# Patient Record
Sex: MALE | Race: WHITE | URBAN - METROPOLITAN AREA
[De-identification: names, ages, dates, MRNs, and addresses within clinical notes are randomized per-mention and may not be internally consistent; named-entity substitution may affect disease eponyms.]

---

## 2022-03-18 PROBLEM — F51.04 INSOMNIA, PSYCHOPHYSIOLOGICAL: Status: ACTIVE | Noted: 2021-10-25

## 2022-03-19 PROBLEM — F41.1 GAD (GENERALIZED ANXIETY DISORDER): Status: ACTIVE | Noted: 2021-09-27

## 2022-03-20 PROBLEM — F31.76 BIPOLAR DISORDER, IN FULL REMISSION, MOST RECENT EPISODE DEPRESSED (HCC): Status: ACTIVE | Noted: 2021-09-27

## 2022-07-14 ENCOUNTER — TELEMEDICINE (OUTPATIENT)
Dept: BEHAVIORAL/MENTAL HEALTH CLINIC | Age: 47
End: 2022-07-14
Payer: COMMERCIAL

## 2022-07-14 DIAGNOSIS — F41.1 GAD (GENERALIZED ANXIETY DISORDER): ICD-10-CM

## 2022-07-14 DIAGNOSIS — F51.04 INSOMNIA, PSYCHOPHYSIOLOGICAL: Primary | ICD-10-CM

## 2022-07-14 DIAGNOSIS — F31.76 BIPOLAR DISORDER, IN FULL REMISSION, MOST RECENT EPISODE DEPRESSED (HCC): ICD-10-CM

## 2022-07-14 PROCEDURE — 99215 OFFICE O/P EST HI 40 MIN: CPT | Performed by: NURSE PRACTITIONER

## 2022-07-14 RX ORDER — ARIPIPRAZOLE 20 MG/1
20 TABLET ORAL DAILY
Qty: 30 TABLET | Refills: 3 | Status: SHIPPED | OUTPATIENT
Start: 2022-07-14 | End: 2022-10-14 | Stop reason: SDUPTHER

## 2022-07-14 RX ORDER — QUETIAPINE FUMARATE 400 MG/1
800 TABLET, FILM COATED ORAL NIGHTLY
Qty: 60 TABLET | Refills: 3 | Status: SHIPPED | OUTPATIENT
Start: 2022-07-14 | End: 2022-10-14 | Stop reason: SDUPTHER

## 2022-07-14 RX ORDER — CLONAZEPAM 1 MG/1
1 TABLET ORAL 2 TIMES DAILY PRN
Qty: 60 TABLET | Refills: 2 | Status: SHIPPED | OUTPATIENT
Start: 2022-07-14 | End: 2022-10-14 | Stop reason: SDUPTHER

## 2022-07-14 RX ORDER — TEMAZEPAM 30 MG/1
30 CAPSULE ORAL NIGHTLY PRN
Qty: 30 CAPSULE | Refills: 2 | Status: SHIPPED | OUTPATIENT
Start: 2022-07-14 | End: 2022-10-14 | Stop reason: SDUPTHER

## 2022-07-14 RX ORDER — LITHIUM CARBONATE 300 MG/1
300 CAPSULE ORAL DAILY
Qty: 30 CAPSULE | Refills: 3 | Status: SHIPPED | OUTPATIENT
Start: 2022-07-14 | End: 2022-10-14 | Stop reason: SDUPTHER

## 2022-07-14 NOTE — PROGRESS NOTES
OUTPATIENT PSYCHIATRIC RETURN VISIT PROGRESS NOTE    I was in the office While conducting this encounter. Patient was at home. She and/ or her healthcare decision maker is aware that this patient-initialed Telehealth encounter is a billable service with coverage as determined by her insurance carrier. She is aware that she may receive a bill and has provided verbal consent to proceed: Yes    The Virtual visit was conducted via [employer]. Pursuant to the emergency declaration under the [facility], [facility] waiver authority and the [employer] Act, this Virtual  Visit was conducted to reduce the patient's risk of exposure to COVID-19 and provide continuity of care for an established patient. Services were provided through a video synchronous discussion virtually to substitute for in-person clinic visit. Due to this being a TeleHealth evaluation, many elements of the physical examination are unable to be assessed. Total Time:  minutes: 40-54 minutes          Date of Service: 7/14/2022     Kenia Bruce is a 40 y.o.  with a past psychiatric history of bipolar d/o, anxiety, insomnia  who presents today for a psychiatric follow up appointment. CC:  Routine medication management follow up. No chief complaint on file. Subjective / Interval History:    Pt  Visit via [employer]  today and reports that  His mood has been stable. Denies depressive or manic episodes since last visit. Sleeping and eating well. Still working nights which is his preference. All medications effective and wishes to continue at current doses. He has done well with the reduced dose of Lithium with no change in mood. Pt has been medication compliant and denies any side-effects. Pt denies SI, HI and AVH. Does not endorse any manic or psychotic symptoms.     Psychiatric Review Of Systems:  Sleep: generally restful sleep  Appetite: ok  Current suicidal/homicidal ideations: Denies SI/ HI  Current auditory/visual hallucinations: Denies     Medications:    Current Outpatient Medications:     ARIPiprazole (ABILIFY) 20 MG tablet, Take 20 mg by mouth daily, Disp: , Rfl:     clonazePAM (KLONOPIN) 1 MG tablet, Take 1 mg by mouth 2 times daily as needed. , Disp: , Rfl:     lithium 300 MG capsule, Take 600 mg by mouth, Disp: , Rfl:     meloxicam (MOBIC) 15 MG tablet, TAKE 1 (ONE) TABLET ONCE DAILY. MAY MAKE DROWSY, Disp: , Rfl:     QUEtiapine (SEROQUEL) 400 MG tablet, Take 800 mg by mouth, Disp: , Rfl:     temazepam (RESTORIL) 30 MG capsule, Take 30 mg by mouth., Disp: , Rfl:        PMH:  History reviewed. No pertinent past medical history. Vitals: There were no vitals filed for this visit. ROS:  Psychological ROS: negative for anxiety, depression, irritability, mood swings and sleep disturbance    Mental Status Exam:   Appearance  Appears stated age, Well-kept, Appropriately attired, Well-nourished, Cooperative and Maintains eye-contact  Behavior  Cooperative and Pleasant  Psychomotor Activity within normal limits  Gait and Station Normal [place] and Station and Normal Balance  Speech   appropriate and spontaneous  Mood    is euthymic  Affect    calm  Thought Process Goal-Directed  Thought Content/Perceptual Disturbances free of delusions, free of hallucinations and not internally preoccupied  Cognition/Sensorium Alert, Fully oriented, Normal concentration/ Attention, Recent memory intact, Remote memory intact and Fund of knowledge adequate for level of education  Insight  good  Judgment good  Assessment:    Diagnoses and all orders for this visit:    Insomnia, psychophysiological  -     temazepam (RESTORIL) 30 MG capsule; Take 1 capsule by mouth nightly as needed for Sleep for up to 30 days. MONROE (generalized anxiety disorder)  -     clonazePAM (KLONOPIN) 1 MG tablet;  Take 1 tablet by mouth 2 times daily as needed for Anxiety for up to 30 days.    Bipolar disorder, in full remission, most recent episode depressed ([facility] [address]    Other orders  -     QUEtiapine (SEROQUEL) 400 MG tablet; Take 2 tablets by mouth nightly  -     lithium 300 MG capsule; Take 1 capsule by mouth daily  -     ARIPiprazole (ABILIFY) 20 MG tablet; Take 1 tablet by mouth daily       Patient Education and Counseling:  Supportive therapy provided for identified psychosocial stressors. Medication education provided and decisions regarding medication regimen discussed with patient. Plan:  -  Continue all medications as currently ordered-    Crisis plan reviewed and patient verbally contracts for safety. Go to ED with emergent symptoms or safety concerns.  -  Risks, benefits, side effects of medications, including any / all black box warnings, discussed with patient, who verbalizes their understanding.     - [employer] web site checked for controlled substances. Disposition:  home    Follow Up:  Return in 3 months, or call in the interim for any side-effects, decompensation, questions, or problems between now and the next visit. Return in about 3 months (around 10/14/2022) for Follow up. 41 minutes face to face with the patient with more than 50% of the total time spent on education, counseling, & coordination of care of the patient regarding ongoing psychiatric illness.         [address]

## 2022-10-14 ENCOUNTER — TELEMEDICINE (OUTPATIENT)
Dept: BEHAVIORAL/MENTAL HEALTH CLINIC | Age: 47
End: 2022-10-14
Payer: COMMERCIAL

## 2022-10-14 DIAGNOSIS — F31.76 BIPOLAR DISORDER, IN FULL REMISSION, MOST RECENT EPISODE DEPRESSED (HCC): ICD-10-CM

## 2022-10-14 DIAGNOSIS — F41.1 GAD (GENERALIZED ANXIETY DISORDER): ICD-10-CM

## 2022-10-14 DIAGNOSIS — F51.04 INSOMNIA, PSYCHOPHYSIOLOGICAL: Primary | ICD-10-CM

## 2022-10-14 PROCEDURE — 99215 OFFICE O/P EST HI 40 MIN: CPT | Performed by: NURSE PRACTITIONER

## 2022-10-14 RX ORDER — TEMAZEPAM 30 MG/1
30 CAPSULE ORAL NIGHTLY PRN
Qty: 30 CAPSULE | Refills: 2 | Status: SHIPPED | OUTPATIENT
Start: 2022-10-14 | End: 2022-11-13

## 2022-10-14 RX ORDER — CLONAZEPAM 1 MG/1
1 TABLET ORAL 2 TIMES DAILY PRN
Qty: 60 TABLET | Refills: 2 | Status: SHIPPED | OUTPATIENT
Start: 2022-10-14 | End: 2022-11-13

## 2022-10-14 RX ORDER — ARIPIPRAZOLE 20 MG/1
20 TABLET ORAL DAILY
Qty: 30 TABLET | Refills: 3 | Status: SHIPPED | OUTPATIENT
Start: 2022-10-14 | End: 2022-11-13

## 2022-10-14 RX ORDER — LITHIUM CARBONATE 150 MG/1
150 CAPSULE ORAL DAILY
Qty: 30 CAPSULE | Refills: 3 | Status: SHIPPED | OUTPATIENT
Start: 2022-10-14 | End: 2022-11-13

## 2022-10-14 RX ORDER — QUETIAPINE FUMARATE 400 MG/1
800 TABLET, FILM COATED ORAL NIGHTLY
Qty: 60 TABLET | Refills: 3 | Status: SHIPPED | OUTPATIENT
Start: 2022-10-14 | End: 2022-11-13

## 2022-10-14 NOTE — PROGRESS NOTES
OUTPATIENT PSYCHIATRIC RETURN VISIT PROGRESS NOTE    I was in the office While conducting this encounter. Pt was at home. She and/ or her healthcare decision maker is aware that this patient-initialed Telehealth encounter is a billable service with coverage as determined by her insurance carrier. She is aware that she may receive a bill and has provided verbal consent to proceed: Yes    The Virtual visit was conducted via [employer] me. Pursuant to the emergency declaration under the [facility], [facility] waiver authority and the [employer] Act, this Virtual  Visit was conducted to reduce the patient's risk of exposure to COVID-19 and provide continuity of care for an established patient. Services were provided through a video synchronous discussion virtually to substitute for in-person clinic visit. Due to this being a TeleHealth evaluation, many elements of the physical examination are unable to be assessed. Total Time:  minutes: 40-54 minutes         Date of Service: 10/14/2022     Identification    Emmanuel Good is a 40 y.o.  with a past psychiatric history of Bipolar d/o, insomnia,MONROE  who presents today for a psychiatric follow up appointment. CC:  Routine medication management follow up. No chief complaint on file. Subjective / Interval History:    Pt  visit via [employer] today and reports that mood remains stable, no manic or depressive episodes, sleeping well, appetite good. Took a vacation recently to [place] which he enjoyed. Says work is going well. He continues to struggle with chronic neuropathic pain for which he saw Dr. Johnny Parmar (neuro) x 1 before he left the practice. The patient has not returned to see another provider there because he says \" I did not think anything else could be done to help me\". After reviewing Dr. Gaspar Mater note it appears that he was considering a referral to PT.  He also felt that the Lithium was contributing to the neuropathy. I have decreased the Lithium to 300 mg daily so far and today will further decrease to 150 mg daily. Pt has tolerated the decreased dose but has not yet seen any improvement in neuropathic pain. I encouraged him to follow up with neuro to see about PT referral that was suggested and to inquire about any pharmacologic treatment options. Pt has been medication compliant and denies any side-effects. Pt denies SI, HI and AVH. Does not endorse any manic or psychotic symptoms. Psychiatric Review Of Systems:  Sleep: generally restful sleep  Appetite: ok  Current suicidal/homicidal ideations: Denies SI/ HI  Current auditory/visual hallucinations: Denies     Medications:    Current Outpatient Medications:     QUEtiapine (SEROQUEL) 400 MG tablet, Take 2 tablets by mouth nightly, Disp: 60 tablet, Rfl: 3    clonazePAM (KLONOPIN) 1 MG tablet, Take 1 tablet by mouth 2 times daily as needed for Anxiety for up to 30 days. , Disp: 60 tablet, Rfl: 2    lithium 300 MG capsule, Take 1 capsule by mouth daily, Disp: 30 capsule, Rfl: 3    ARIPiprazole (ABILIFY) 20 MG tablet, Take 1 tablet by mouth daily, Disp: 30 tablet, Rfl: 3    temazepam (RESTORIL) 30 MG capsule, Take 1 capsule by mouth nightly as needed for Sleep for up to 30 days. , Disp: 30 capsule, Rfl: 2    meloxicam (MOBIC) 15 MG tablet, TAKE 1 (ONE) TABLET ONCE DAILY. MAY MAKE DROWSY, Disp: , Rfl:        PMH:  History reviewed. No pertinent past medical history. Vitals: There were no vitals filed for this visit.     ROS:  Psychological ROS: negative for anxiety, depression, irritability, loss of interest in favorite activities, mood swings, and sleep disturbance    Mental Status Exam:   Appearance  Appears stated age, Well-kept, Appropriately attired, Well-nourished, Cooperative, and Maintains eye-contact  Behavior  Cooperative and Pleasant  Psychomotor Activity within normal limits  Gait and Station Normal [facility] and Normal Balance  Speech   appropriate  Mood    is euthymic  Affect    calm  Thought Process Goal-Directed  Thought Content/Perceptual Disturbances free of delusions, free of hallucinations, and not internally preoccupied  Cognition/Sensorium Alert, Fully oriented, Normal concentration/ Attention, Recent memory intact, Remote memory intact, and Fund of knowledge adequate for level of education  Insight  good  Judgment good  Assessment:    Diagnoses and all orders for this visit:    Insomnia, psychophysiological  -     temazepam (RESTORIL) 30 MG capsule; Take 1 capsule by mouth nightly as needed for Sleep for up to 30 days. MONROE (generalized anxiety disorder)  -     clonazePAM (KLONOPIN) 1 MG tablet; Take 1 tablet by mouth 2 times daily as needed for Anxiety for up to 30 days. Bipolar disorder, in full remission, most recent episode depressed ([facility] [address]    Other orders  -     QUEtiapine (SEROQUEL) 400 MG tablet; Take 2 tablets by mouth nightly  -     lithium 150 MG capsule; Take 1 capsule by mouth daily  -     ARIPiprazole (ABILIFY) 20 MG tablet; Take 1 tablet by mouth daily     Patient Education and Counseling:  Supportive therapy provided for identified psychosocial stressors. Medication education provided and decisions regarding medication regimen discussed with patient. Plan:  -  Continue quetiapine 800 mg HS, temazepam 30 mg Hs prn, clonazepam 1 mg BID prn, Abilify 20 mg daily. Decrease Lithium to 150 mg daily. -  Crisis plan reviewed and patient verbally contracts for safety. Go to ED with emergent symptoms or safety concerns.  -  Risks, benefits, side effects of medications, including any / all black box warnings, discussed with patient, who verbalizes their understanding.     - [employer] web site checked for controlled substances.      Disposition:  home    Follow Up:  Return in 3 months, or call in the interim for any side-effects, decompensation, questions, or problems between now and the next visit. Return in about 3 months (around 1/14/2023) for Follow up. 46 minutes face to face with the patient with more than 50% of the total time spent on education, counseling, & coordination of care of the patient regarding ongoing psychiatric illness.         [address] [facility] [address]

## 2023-01-13 ENCOUNTER — TELEMEDICINE (OUTPATIENT)
Dept: BEHAVIORAL/MENTAL HEALTH CLINIC | Age: 48
End: 2023-01-13
Payer: COMMERCIAL

## 2023-01-13 DIAGNOSIS — F31.76 BIPOLAR DISORDER, IN FULL REMISSION, MOST RECENT EPISODE DEPRESSED (HCC): Primary | ICD-10-CM

## 2023-01-13 DIAGNOSIS — F41.1 GAD (GENERALIZED ANXIETY DISORDER): ICD-10-CM

## 2023-01-13 DIAGNOSIS — F51.04 INSOMNIA, PSYCHOPHYSIOLOGICAL: ICD-10-CM

## 2023-01-13 PROCEDURE — 99215 OFFICE O/P EST HI 40 MIN: CPT | Performed by: NURSE PRACTITIONER

## 2023-01-13 RX ORDER — TEMAZEPAM 30 MG/1
30 CAPSULE ORAL NIGHTLY PRN
Qty: 30 CAPSULE | Refills: 2 | Status: SHIPPED | OUTPATIENT
Start: 2023-01-15 | End: 2023-02-14

## 2023-01-13 RX ORDER — GABAPENTIN 100 MG/1
CAPSULE ORAL
COMMUNITY
Start: 2022-12-23

## 2023-01-13 RX ORDER — ARIPIPRAZOLE 20 MG/1
20 TABLET ORAL DAILY
Qty: 30 TABLET | Refills: 3 | Status: SHIPPED | OUTPATIENT
Start: 2023-01-13 | End: 2023-02-12

## 2023-01-13 RX ORDER — QUETIAPINE FUMARATE 400 MG/1
800 TABLET, FILM COATED ORAL NIGHTLY
Qty: 60 TABLET | Refills: 3 | Status: SHIPPED | OUTPATIENT
Start: 2023-01-13 | End: 2023-02-12

## 2023-01-13 RX ORDER — CLONAZEPAM 1 MG/1
1 TABLET ORAL 2 TIMES DAILY PRN
Qty: 60 TABLET | Refills: 2 | Status: SHIPPED | OUTPATIENT
Start: 2023-01-15 | End: 2023-02-14

## 2023-01-13 ASSESSMENT — PATIENT HEALTH QUESTIONNAIRE - PHQ9
SUM OF ALL RESPONSES TO PHQ QUESTIONS 1-9: 1
SUM OF ALL RESPONSES TO PHQ9 QUESTIONS 1 & 2: 1
1. LITTLE INTEREST OR PLEASURE IN DOING THINGS: 1
2. FEELING DOWN, DEPRESSED OR HOPELESS: 0
SUM OF ALL RESPONSES TO PHQ QUESTIONS 1-9: 1

## 2023-01-13 NOTE — PROGRESS NOTES
OUTPATIENT PSYCHIATRIC RETURN VISIT PROGRESS NOTE    I was in the office While conducting this encounter. Pt was at home    She and/ or her healthcare decision maker is aware that this patient-initialed Telehealth encounter is a billable service with coverage as determined by her insurance carrier. She is aware that she may receive a bill and has provided verbal consent to proceed: Yes    The Virtual visit was conducted via [address] Pursuant to the emergency declaration under the [facility] waiver authority and the [employer] Act, this Virtual  Visit was conducted to reduce the patient's risk of exposure to COVID-19 and provide continuity of care for an established patient. Services were provided through a video synchronous discussion virtually to substitute for in-person clinic visit. Due to this being a TeleHealth evaluation, many elements of the physical examination are unable to be assessed. Total Time:  minutes: 40-54 minutes         Date of Service: 1/13/2023     Identification    Tato Dawkins is a 56 y.o.  with a past psychiatric history of bipolar d/o, anxiety, insomnia  who presents today for a psychiatric follow up appointment. CC:  Routine medication management follow up. Chief Complaint   Patient presents with    Follow-up        Subjective / Interval History:    Pt comes to clinic today and reports that his mood remains stable on current medication regimen. His Lithium has been gradually reduced. He has experienced no destabilization in his mood. Will DC the Lithium and monitor for any sx of depression or fabian. Otherwise will continue with all medications as current as they are effective and well tolerated. He did return to neuro and was started on a very low dose of gabapentin and diclofenac 1% topical for pain. Has not been referred to PT but will ask his PCP about this.   Pt has been medication compliant and denies any side-effects. Pt denies SI, HI and AVH. Does not endorse any manic or psychotic symptoms. Psychiatric Review Of Systems:  Sleep: generally restful sleep  Appetite: ok  Current suicidal/homicidal ideations: Denies SI/ HI  Current auditory/visual hallucinations: Denies     Medications:    Current Outpatient Medications:     gabapentin (NEURONTIN) 100 MG capsule, Take 2 capsules (200 mg) nightly., Disp: , Rfl:     clonazePAM (KLONOPIN) 1 MG tablet, Take 1 tablet by mouth 2 times daily as needed for Anxiety for up to 30 days. , Disp: 60 tablet, Rfl: 2    QUEtiapine (SEROQUEL) 400 MG tablet, Take 2 tablets by mouth nightly, Disp: 60 tablet, Rfl: 3    lithium 150 MG capsule, Take 1 capsule by mouth daily, Disp: 30 capsule, Rfl: 3    ARIPiprazole (ABILIFY) 20 MG tablet, Take 1 tablet by mouth daily, Disp: 30 tablet, Rfl: 3    temazepam (RESTORIL) 30 MG capsule, Take 1 capsule by mouth nightly as needed for Sleep for up to 30 days. , Disp: 30 capsule, Rfl: 2    meloxicam (MOBIC) 15 MG tablet, TAKE 1 (ONE) TABLET ONCE DAILY. MAY MAKE DROWSY (Patient not taking: Reported on 1/13/2023), Disp: , Rfl:        PMH:  History reviewed. No pertinent past medical history. Vitals: There were no vitals filed for this visit.     ROS:  Psychological ROS: negative for anxiety, depression, irritability, mood swings, and sleep disturbance    Mental Status Exam:   Appearance  Appears stated age, Appropriately attired, Well-nourished, Cooperative, and Maintains eye-contact  Behavior  Cooperative and Pleasant  Psychomotor Activity within normal limits  Gait and Station Normal [place] and Station and Normal Balance  Speech   appropriate  Mood    is euthymic  Affect    calm  Thought Process Goal-Directed and Circumstantial  Thought Content/Perceptual Disturbances free of delusions, free of hallucinations, and not internally preoccupied  Cognition/Sensorium Alert, Fully oriented, Normal concentration/ Attention, Recent memory intact, Remote memory intact, and Fund of knowledge adequate for level of education  Insight  good  Judgment good  Assessment:    Oleg Allen was seen today for follow-up. Diagnoses and all orders for this visit:    Bipolar disorder, in full remission, most recent episode depressed ([facility] [address]    MONROE (generalized anxiety disorder)  -     clonazePAM (KLONOPIN) 1 MG tablet; Take 1 tablet by mouth 2 times daily as needed for Anxiety for up to 30 days. Insomnia, psychophysiological  -     temazepam (RESTORIL) 30 MG capsule; Take 1 capsule by mouth nightly as needed for Sleep for up to 30 days. Other orders  -     ARIPiprazole (ABILIFY) 20 MG tablet; Take 1 tablet by mouth daily  -     QUEtiapine (SEROQUEL) 400 MG tablet; Take 2 tablets by mouth nightly       Patient Education and Counseling:  Supportive therapy provided for identified psychosocial stressors. Medication education provided and decisions regarding medication regimen discussed with patient. Plan:  -  Continue PRN Klonopin and PRN temazepam,Abilify, quetiapine. DC Lithium.  -  Crisis plan reviewed and patient verbally contracts for safety. Go to ED with emergent symptoms or safety concerns.  -  Risks, benefits, side effects of medications, including any / all black box warnings, discussed with patient, who verbalizes their understanding.     - [employer] web site checked for controlled substances. Disposition:  home    Follow Up:  Return in 3 months, or call in the interim for any side-effects, decompensation, questions, or problems between now and the next visit. Return in about 3 months (around 4/13/2023) for Follow up. 43 minutes face to face with the patient with more than 50% of the total time spent on education, counseling, & coordination of care of the patient regarding ongoing psychiatric illness.         [address]

## 2023-04-02 DIAGNOSIS — F41.1 GAD (GENERALIZED ANXIETY DISORDER): ICD-10-CM

## 2023-04-08 RX ORDER — CLONAZEPAM 1 MG/1
TABLET ORAL
Qty: 60 TABLET | Refills: 2 | OUTPATIENT
Start: 2023-04-08

## 2023-06-22 RX ORDER — QUETIAPINE FUMARATE 400 MG/1
800 TABLET, FILM COATED ORAL NIGHTLY
Qty: 60 TABLET | Refills: 3 | OUTPATIENT
Start: 2023-06-22 | End: 2023-07-22

## 2023-07-04 DIAGNOSIS — F41.1 GAD (GENERALIZED ANXIETY DISORDER): ICD-10-CM

## 2023-07-04 DIAGNOSIS — F51.04 INSOMNIA, PSYCHOPHYSIOLOGICAL: ICD-10-CM

## 2023-07-05 RX ORDER — CLONAZEPAM 1 MG/1
TABLET ORAL
Qty: 60 TABLET | Refills: 2 | OUTPATIENT
Start: 2023-07-05

## 2023-07-05 RX ORDER — TEMAZEPAM 30 MG/1
CAPSULE ORAL
Qty: 30 CAPSULE | Refills: 2 | OUTPATIENT
Start: 2023-07-05

## 2023-07-14 ENCOUNTER — TELEMEDICINE (OUTPATIENT)
Dept: BEHAVIORAL/MENTAL HEALTH CLINIC | Age: 48
End: 2023-07-14
Payer: COMMERCIAL

## 2023-07-14 DIAGNOSIS — F51.04 INSOMNIA, PSYCHOPHYSIOLOGICAL: Primary | ICD-10-CM

## 2023-07-14 DIAGNOSIS — F31.76 BIPOLAR DISORDER, IN FULL REMISSION, MOST RECENT EPISODE DEPRESSED (HCC): ICD-10-CM

## 2023-07-14 DIAGNOSIS — F41.1 GAD (GENERALIZED ANXIETY DISORDER): ICD-10-CM

## 2023-07-14 PROCEDURE — 99215 OFFICE O/P EST HI 40 MIN: CPT | Performed by: NURSE PRACTITIONER

## 2023-07-14 RX ORDER — CLONAZEPAM 1 MG/1
1 TABLET ORAL 2 TIMES DAILY PRN
Qty: 60 TABLET | Refills: 2 | Status: SHIPPED | OUTPATIENT
Start: 2023-07-19 | End: 2023-08-18

## 2023-07-14 RX ORDER — TEMAZEPAM 30 MG/1
30 CAPSULE ORAL NIGHTLY PRN
Qty: 30 CAPSULE | Refills: 2 | Status: SHIPPED | OUTPATIENT
Start: 2023-07-19 | End: 2023-08-18

## 2023-07-14 RX ORDER — QUETIAPINE FUMARATE 400 MG/1
800 TABLET, FILM COATED ORAL NIGHTLY
Qty: 60 TABLET | Refills: 3 | Status: SHIPPED | OUTPATIENT
Start: 2023-07-14 | End: 2023-08-13

## 2023-07-14 RX ORDER — ARIPIPRAZOLE 20 MG/1
20 TABLET ORAL DAILY
Qty: 30 TABLET | Refills: 3 | Status: SHIPPED | OUTPATIENT
Start: 2023-07-14 | End: 2023-08-13

## 2023-07-14 ASSESSMENT — PATIENT HEALTH QUESTIONNAIRE - PHQ9
1. LITTLE INTEREST OR PLEASURE IN DOING THINGS: 0
SUM OF ALL RESPONSES TO PHQ QUESTIONS 1-9: 0
2. FEELING DOWN, DEPRESSED OR HOPELESS: 0
SUM OF ALL RESPONSES TO PHQ9 QUESTIONS 1 & 2: 0

## 2023-07-14 NOTE — PROGRESS NOTES
the next visit. Return in about 13 weeks (around 10/13/2023) for Follow up.        45 minutes face to face with the patient with more than 50% of the total time spent on education, counseling, & coordination of care of the patient regarding ongoing psychiatric illness.         [address]

## 2023-10-16 ENCOUNTER — TELEMEDICINE (OUTPATIENT)
Dept: BEHAVIORAL/MENTAL HEALTH CLINIC | Age: 48
End: 2023-10-16
Payer: COMMERCIAL

## 2023-10-16 DIAGNOSIS — F51.04 INSOMNIA, PSYCHOPHYSIOLOGICAL: Primary | ICD-10-CM

## 2023-10-16 DIAGNOSIS — F41.1 GAD (GENERALIZED ANXIETY DISORDER): ICD-10-CM

## 2023-10-16 DIAGNOSIS — F31.76 BIPOLAR DISORDER, IN FULL REMISSION, MOST RECENT EPISODE DEPRESSED (HCC): ICD-10-CM

## 2023-10-16 PROCEDURE — 99215 OFFICE O/P EST HI 40 MIN: CPT | Performed by: NURSE PRACTITIONER

## 2023-10-16 RX ORDER — ARIPIPRAZOLE 20 MG/1
20 TABLET ORAL DAILY
Qty: 30 TABLET | Refills: 3 | Status: SHIPPED | OUTPATIENT
Start: 2023-10-16 | End: 2023-11-15

## 2023-10-16 RX ORDER — CLONAZEPAM 1 MG/1
1 TABLET ORAL 2 TIMES DAILY PRN
Qty: 60 TABLET | Refills: 2 | Status: SHIPPED | OUTPATIENT
Start: 2023-10-21 | End: 2023-11-20

## 2023-10-16 RX ORDER — TEMAZEPAM 30 MG/1
30 CAPSULE ORAL NIGHTLY PRN
Qty: 30 CAPSULE | Refills: 2 | Status: SHIPPED | OUTPATIENT
Start: 2023-10-21 | End: 2023-11-20

## 2023-10-16 RX ORDER — QUETIAPINE FUMARATE 400 MG/1
800 TABLET, FILM COATED ORAL NIGHTLY
Qty: 60 TABLET | Refills: 3 | Status: SHIPPED | OUTPATIENT
Start: 2023-10-16 | End: 2023-11-15

## 2023-10-16 NOTE — PROGRESS NOTES
OUTPATIENT PSYCHIATRIC RETURN VISIT PROGRESS NOTE    I was in the office While conducting this encounter. Pt was at home    She and/ or her healthcare decision maker is aware that this patient-initialed Telehealth encounter is a billable service with coverage as determined by her insurance carrier. She is aware that she may receive a bill and has provided verbal consent to proceed: Yes    The Virtual visit was conducted via [address] [place] [address]. Pursuant to the emergency declaration under the [place] [address] [facility] authority and the [employer] and [employer] Act, this Virtual  Visit was conducted to reduce the patient's risk of exposure to COVID-19 and provide continuity of care for an established patient. Services were provided through a video synchronous discussion virtually to substitute for in-person clinic visit. Due to this being a TeleHealth evaluation, many elements of the physical examination are unable to be assessed. Total Time:  minutes: 40-54 minutes         Date of Service: 10/16/2023     Identification    Iona Willard is a 56 y.o.  with a past psychiatric history of bipolar d/o, MONROE, insomnia  who presents today for a psychiatric follow up appointment. CC:  Routine medication management follow up. Chief Complaint   Patient presents with    Follow-up        Subjective / Interval History:    Pt  visit via My Chart today and reports that mood remains stable on current medications with no adverse effects and wishes to continue at current doses. Wife moved back in with him in May after 6 yr separation and says things are going well. Pt has been medication compliant and denies any side-effects. Pt denies SI, HI and AVH. Does not endorse any manic or psychotic symptoms.     Psychiatric Review Of Systems:  Sleep: generally restful sleep  Appetite: ok  Current suicidal/homicidal ideations: Denies SI/ HI  Current auditory/visual

## 2023-12-04 ENCOUNTER — TELEMEDICINE (OUTPATIENT)
Dept: BEHAVIORAL/MENTAL HEALTH CLINIC | Age: 48
End: 2023-12-04
Payer: COMMERCIAL

## 2023-12-04 DIAGNOSIS — F31.76 BIPOLAR DISORDER, IN FULL REMISSION, MOST RECENT EPISODE DEPRESSED (HCC): Primary | ICD-10-CM

## 2023-12-04 DIAGNOSIS — F51.04 INSOMNIA, PSYCHOPHYSIOLOGICAL: ICD-10-CM

## 2023-12-04 DIAGNOSIS — F41.1 GAD (GENERALIZED ANXIETY DISORDER): ICD-10-CM

## 2023-12-04 PROCEDURE — 99215 OFFICE O/P EST HI 40 MIN: CPT | Performed by: NURSE PRACTITIONER

## 2023-12-04 RX ORDER — ARIPIPRAZOLE 20 MG/1
20 TABLET ORAL DAILY
Qty: 30 TABLET | Refills: 3 | Status: SHIPPED | OUTPATIENT
Start: 2023-12-04 | End: 2024-01-03

## 2023-12-04 RX ORDER — CLONAZEPAM 1 MG/1
1 TABLET ORAL 2 TIMES DAILY PRN
Qty: 60 TABLET | Refills: 2 | Status: SHIPPED | OUTPATIENT
Start: 2023-12-21 | End: 2024-01-20

## 2023-12-04 RX ORDER — TEMAZEPAM 30 MG/1
30 CAPSULE ORAL NIGHTLY PRN
Qty: 30 CAPSULE | Refills: 2 | Status: SHIPPED | OUTPATIENT
Start: 2023-12-21 | End: 2024-01-20

## 2023-12-04 RX ORDER — QUETIAPINE FUMARATE 400 MG/1
800 TABLET, FILM COATED ORAL NIGHTLY
Qty: 60 TABLET | Refills: 3 | Status: SHIPPED | OUTPATIENT
Start: 2023-12-04 | End: 2024-01-03

## 2023-12-04 ASSESSMENT — PATIENT HEALTH QUESTIONNAIRE - PHQ9
2. FEELING DOWN, DEPRESSED OR HOPELESS: 0
SUM OF ALL RESPONSES TO PHQ QUESTIONS 1-9: 0
SUM OF ALL RESPONSES TO PHQ9 QUESTIONS 1 & 2: 0
1. LITTLE INTEREST OR PLEASURE IN DOING THINGS: 0
SUM OF ALL RESPONSES TO PHQ QUESTIONS 1-9: 0

## 2023-12-04 NOTE — PROGRESS NOTES
OUTPATIENT PSYCHIATRIC RETURN VISIT PROGRESS NOTE    I was in the office While conducting this encounter. Pt was at home    She and/ or her healthcare decision maker is aware that this patient-initialed Telehealth encounter is a billable service with coverage as determined by her insurance carrier. She is aware that she may receive a bill and has provided verbal consent to proceed: Yes    The Virtual visit was conducted via [address] [place] [address]. Pursuant to the emergency declaration under the [place] [address] [facility] authority and the [employer] and [employer] Act, this Virtual  Visit was conducted to reduce the patient's risk of exposure to COVID-19 and provide continuity of care for an established patient. Services were provided through a video synchronous discussion virtually to substitute for in-person clinic visit. Due to this being a TeleHealth evaluation, many elements of the physical examination are unable to be assessed. Total Time:  minutes: 40-54 minutes         Date of Service: 12/4/2023     Identification    Stephanie eWber is a 53 y.o.  with a past psychiatric history of bipolar do, MONROE, insomnia  who presents today for a psychiatric follow up appointment. CC:  Routine medication management follow up. Chief Complaint   Patient presents with    Follow-up        Subjective / Interval History:    Pt  visit via My Chart today and reports that mood remains stable on current medications and denies any adverse effects. Denies any manic or depressive episodes. Denies any psychotic symptoms. Says things have been stressful at work lately due to being more busy during the holidays. Pt has been medication compliant and denies any side-effects. Pt denies SI, HI and AVH. Does not endorse any manic or psychotic symptoms.     Psychiatric Review Of Systems:  Sleep: generally restful sleep  Appetite: ok  Current suicidal/homicidal ideations:

## 2024-03-06 ENCOUNTER — TELEMEDICINE (OUTPATIENT)
Dept: BEHAVIORAL/MENTAL HEALTH CLINIC | Age: 49
End: 2024-03-06
Payer: COMMERCIAL

## 2024-03-06 DIAGNOSIS — F51.04 INSOMNIA, PSYCHOPHYSIOLOGICAL: ICD-10-CM

## 2024-03-06 DIAGNOSIS — F31.76 BIPOLAR DISORDER, IN FULL REMISSION, MOST RECENT EPISODE DEPRESSED (HCC): Primary | ICD-10-CM

## 2024-03-06 DIAGNOSIS — F41.1 GAD (GENERALIZED ANXIETY DISORDER): ICD-10-CM

## 2024-03-06 PROCEDURE — 99215 OFFICE O/P EST HI 40 MIN: CPT | Performed by: NURSE PRACTITIONER

## 2024-03-06 RX ORDER — CLONAZEPAM 1 MG/1
1 TABLET ORAL 2 TIMES DAILY PRN
Qty: 60 TABLET | Refills: 2 | Status: SHIPPED | OUTPATIENT
Start: 2024-03-25 | End: 2024-06-23

## 2024-03-06 RX ORDER — ARIPIPRAZOLE 20 MG/1
20 TABLET ORAL DAILY
Qty: 30 TABLET | Refills: 3 | Status: SHIPPED | OUTPATIENT
Start: 2024-03-06 | End: 2024-07-04

## 2024-03-06 RX ORDER — METOPROLOL SUCCINATE 100 MG/1
100 TABLET, EXTENDED RELEASE ORAL
COMMUNITY
Start: 2024-03-05

## 2024-03-06 RX ORDER — QUETIAPINE FUMARATE 400 MG/1
800 TABLET, FILM COATED ORAL NIGHTLY
Qty: 60 TABLET | Refills: 3 | Status: SHIPPED | OUTPATIENT
Start: 2024-03-06 | End: 2024-07-04

## 2024-03-06 RX ORDER — TEMAZEPAM 30 MG/1
30 CAPSULE ORAL NIGHTLY PRN
Qty: 30 CAPSULE | Refills: 2 | Status: SHIPPED | OUTPATIENT
Start: 2024-03-25 | End: 2024-06-23

## 2024-03-06 ASSESSMENT — PATIENT HEALTH QUESTIONNAIRE - PHQ9
8. MOVING OR SPEAKING SO SLOWLY THAT OTHER PEOPLE COULD HAVE NOTICED. OR THE OPPOSITE, BEING SO FIGETY OR RESTLESS THAT YOU HAVE BEEN MOVING AROUND A LOT MORE THAN USUAL: 0
4. FEELING TIRED OR HAVING LITTLE ENERGY: 0
2. FEELING DOWN, DEPRESSED OR HOPELESS: 0
3. TROUBLE FALLING OR STAYING ASLEEP: 1
6. FEELING BAD ABOUT YOURSELF - OR THAT YOU ARE A FAILURE OR HAVE LET YOURSELF OR YOUR FAMILY DOWN: 0
SUM OF ALL RESPONSES TO PHQ QUESTIONS 1-9: 4
SUM OF ALL RESPONSES TO PHQ QUESTIONS 1-9: 4
10. IF YOU CHECKED OFF ANY PROBLEMS, HOW DIFFICULT HAVE THESE PROBLEMS MADE IT FOR YOU TO DO YOUR WORK, TAKE CARE OF THINGS AT HOME, OR GET ALONG WITH OTHER PEOPLE: 0
1. LITTLE INTEREST OR PLEASURE IN DOING THINGS: 3
5. POOR APPETITE OR OVEREATING: 0
SUM OF ALL RESPONSES TO PHQ QUESTIONS 1-9: 4
7. TROUBLE CONCENTRATING ON THINGS, SUCH AS READING THE NEWSPAPER OR WATCHING TELEVISION: 0
9. THOUGHTS THAT YOU WOULD BE BETTER OFF DEAD, OR OF HURTING YOURSELF: 0
SUM OF ALL RESPONSES TO PHQ QUESTIONS 1-9: 4
SUM OF ALL RESPONSES TO PHQ9 QUESTIONS 1 & 2: 3

## 2024-03-06 NOTE — PROGRESS NOTES
OUTPATIENT PSYCHIATRIC RETURN VISIT PROGRESS NOTE    I was in the office While conducting this encounter.Pt was at home    She and/ or her healthcare decision maker is aware that this patient-initialed Telehealth encounter is a billable service with coverage as determined by her insurance carrier.  She is aware that she may receive a bill and has provided verbal consent to proceed: Yes    The Virtual visit was conducted via [employer]. Pursuant to the emergency declaration under the [employer] Act and the National Emergencies Act, 1135 waiver authority and the Coronavirus Preparedness and Response Supplemental Appropriations Act, this Virtual  Visit was conducted to reduce the patient's risk of exposure to COVID-19 and provide continuity of care for an established patient. Services were provided through a video synchronous discussion virtually to substitute for in-person clinic visit.  Due to this being a TeleHealth evaluation, many elements of the physical examination are unable to be assessed.     Total Time:  minutes: 40-54 minutes         Date of Service: 3/6/2024     Identification    Damion Ayala is a 48 y.o.  with a past psychiatric history of bipolar do, MONROE, insomnia  who presents today for a psychiatric follow up appointment.      CC:  Routine medication management follow up.    Chief Complaint   Patient presents with    Follow-up        Subjective / Interval History:    Pt  visit via My Chart  today and reports that mood remains stable on current medication regimen, with no adverse effects reported and wishes to continue. Continues to due well working nights , sleeping during the day.   Pt has been medication compliant and denies any side-effects. Pt denies SI, HI and AVH. Does not endorse any manic or psychotic symptoms.    Psychiatric Review Of Systems:  Sleep: generally restful sleep  Appetite: ok  Current suicidal/homicidal ideations: Denies SI/ HI  Current auditory/visual hallucinations: Denies

## 2024-06-03 ENCOUNTER — TELEMEDICINE (OUTPATIENT)
Dept: BEHAVIORAL/MENTAL HEALTH CLINIC | Age: 49
End: 2024-06-03
Payer: COMMERCIAL

## 2024-06-03 DIAGNOSIS — F31.76 BIPOLAR DISORDER, IN FULL REMISSION, MOST RECENT EPISODE DEPRESSED (HCC): Primary | ICD-10-CM

## 2024-06-03 DIAGNOSIS — F41.1 GAD (GENERALIZED ANXIETY DISORDER): ICD-10-CM

## 2024-06-03 DIAGNOSIS — F51.04 INSOMNIA, PSYCHOPHYSIOLOGICAL: ICD-10-CM

## 2024-06-03 PROCEDURE — 99215 OFFICE O/P EST HI 40 MIN: CPT | Performed by: NURSE PRACTITIONER

## 2024-06-03 RX ORDER — TEMAZEPAM 30 MG/1
30 CAPSULE ORAL NIGHTLY PRN
Qty: 30 CAPSULE | Refills: 2 | Status: SHIPPED | OUTPATIENT
Start: 2024-06-03 | End: 2024-09-01

## 2024-06-03 RX ORDER — QUETIAPINE FUMARATE 400 MG/1
800 TABLET, FILM COATED ORAL NIGHTLY
Qty: 60 TABLET | Refills: 3 | Status: SHIPPED | OUTPATIENT
Start: 2024-06-03 | End: 2024-10-01

## 2024-06-03 RX ORDER — ARIPIPRAZOLE 20 MG/1
20 TABLET ORAL DAILY
Qty: 30 TABLET | Refills: 3 | Status: SHIPPED | OUTPATIENT
Start: 2024-06-03 | End: 2024-10-01

## 2024-06-03 RX ORDER — CLONAZEPAM 1 MG/1
1 TABLET ORAL 2 TIMES DAILY PRN
Qty: 60 TABLET | Refills: 2 | Status: SHIPPED | OUTPATIENT
Start: 2024-06-03 | End: 2024-09-01

## 2024-06-03 ASSESSMENT — PATIENT HEALTH QUESTIONNAIRE - PHQ9
SUM OF ALL RESPONSES TO PHQ QUESTIONS 1-9: 1
SUM OF ALL RESPONSES TO PHQ9 QUESTIONS 1 & 2: 0
8. MOVING OR SPEAKING SO SLOWLY THAT OTHER PEOPLE COULD HAVE NOTICED. OR THE OPPOSITE, BEING SO FIGETY OR RESTLESS THAT YOU HAVE BEEN MOVING AROUND A LOT MORE THAN USUAL: NOT AT ALL
1. LITTLE INTEREST OR PLEASURE IN DOING THINGS: NOT AT ALL
6. FEELING BAD ABOUT YOURSELF - OR THAT YOU ARE A FAILURE OR HAVE LET YOURSELF OR YOUR FAMILY DOWN: NOT AT ALL
3. TROUBLE FALLING OR STAYING ASLEEP: NOT AT ALL
9. THOUGHTS THAT YOU WOULD BE BETTER OFF DEAD, OR OF HURTING YOURSELF: NOT AT ALL
10. IF YOU CHECKED OFF ANY PROBLEMS, HOW DIFFICULT HAVE THESE PROBLEMS MADE IT FOR YOU TO DO YOUR WORK, TAKE CARE OF THINGS AT HOME, OR GET ALONG WITH OTHER PEOPLE: NOT DIFFICULT AT ALL
4. FEELING TIRED OR HAVING LITTLE ENERGY: SEVERAL DAYS
7. TROUBLE CONCENTRATING ON THINGS, SUCH AS READING THE NEWSPAPER OR WATCHING TELEVISION: NOT AT ALL
2. FEELING DOWN, DEPRESSED OR HOPELESS: NOT AT ALL
5. POOR APPETITE OR OVEREATING: NOT AT ALL
SUM OF ALL RESPONSES TO PHQ QUESTIONS 1-9: 1

## 2024-06-03 NOTE — PROGRESS NOTES
OUTPATIENT PSYCHIATRIC RETURN VISIT PROGRESS NOTE    Date of Service: 6/3/2024     Identification    Damion Ayala is a 48 y.o.  with a past psychiatric history of bipolar d/o, MONROE, insomnia  who presents today for a psychiatric follow up appointment.      CC:  Routine medication management follow up.    Chief Complaint   Patient presents with    Follow-up        Subjective / Interval History:    Pt visit via My Chart today and reports that Patient's mood is stabilizing on current medication regimen and patient is tolerating current medications with no adverse effects. Sleeping well, appetite good . He has been seeing a nutritionist and following recommended diet and has lost 40 lbs. He is proud of this accomplishment and says his energy is improved and he is experiencing less pain since losing weight. Needs to have anti hypertensive medication dose adjusted as BP has improved with weight loss. Has been taking less  prn Klonopin, usually just at HS. Doing well at work. No new problems reported.  . Pt has been medication compliant and denies any side-effects. Pt denies SI, HI and AVH. Does not endorse any manic or psychotic symptoms.    Psychiatric Review Of Systems:  Sleep: generally restful sleep  Appetite: ok  Current suicidal/homicidal ideations: Denies SI/ HI  Current auditory/visual hallucinations: Denies     Medications:    Current Outpatient Medications:     metoprolol succinate (TOPROL XL) 100 MG extended release tablet, Take 1 tablet by mouth, Disp: , Rfl:     clonazePAM (KLONOPIN) 1 MG tablet, Take 1 tablet by mouth 2 times daily as needed for Anxiety for up to 90 days. Max Daily Amount: 2 mg, Disp: 60 tablet, Rfl: 2    temazepam (RESTORIL) 30 MG capsule, Take 1 capsule by mouth nightly as needed for Sleep for up to 90 days. Max Daily Amount: 30 mg, Disp: 30 capsule, Rfl: 2    ARIPiprazole (ABILIFY) 20 MG tablet, Take 1 tablet by mouth daily, Disp: 30 tablet, Rfl: 3    QUEtiapine (SEROQUEL) 400 MG tablet,

## 2024-08-27 ENCOUNTER — TELEMEDICINE (OUTPATIENT)
Dept: BEHAVIORAL/MENTAL HEALTH CLINIC | Age: 49
End: 2024-08-27
Payer: COMMERCIAL

## 2024-08-27 DIAGNOSIS — F31.76 BIPOLAR DISORDER, IN FULL REMISSION, MOST RECENT EPISODE DEPRESSED (HCC): Primary | ICD-10-CM

## 2024-08-27 DIAGNOSIS — F41.1 GAD (GENERALIZED ANXIETY DISORDER): ICD-10-CM

## 2024-08-27 DIAGNOSIS — F51.04 INSOMNIA, PSYCHOPHYSIOLOGICAL: ICD-10-CM

## 2024-08-27 PROCEDURE — 99215 OFFICE O/P EST HI 40 MIN: CPT | Performed by: NURSE PRACTITIONER

## 2024-08-27 RX ORDER — ARIPIPRAZOLE 20 MG/1
20 TABLET ORAL DAILY
Qty: 30 TABLET | Refills: 3 | Status: SHIPPED | OUTPATIENT
Start: 2024-08-27 | End: 2024-12-25

## 2024-08-27 RX ORDER — QUETIAPINE FUMARATE 400 MG/1
800 TABLET, FILM COATED ORAL NIGHTLY
Qty: 60 TABLET | Refills: 3 | Status: SHIPPED | OUTPATIENT
Start: 2024-08-27 | End: 2024-12-25

## 2024-08-27 RX ORDER — TEMAZEPAM 30 MG
30 CAPSULE ORAL NIGHTLY PRN
Qty: 30 CAPSULE | Refills: 2 | Status: SHIPPED | OUTPATIENT
Start: 2024-09-02 | End: 2024-12-01

## 2024-08-27 RX ORDER — CLONAZEPAM 1 MG/1
1 TABLET ORAL DAILY PRN
Qty: 30 TABLET | Refills: 2 | Status: SHIPPED | OUTPATIENT
Start: 2024-09-02 | End: 2024-12-01

## 2024-08-27 ASSESSMENT — PATIENT HEALTH QUESTIONNAIRE - PHQ9
8. MOVING OR SPEAKING SO SLOWLY THAT OTHER PEOPLE COULD HAVE NOTICED. OR THE OPPOSITE, BEING SO FIGETY OR RESTLESS THAT YOU HAVE BEEN MOVING AROUND A LOT MORE THAN USUAL: NOT AT ALL
SUM OF ALL RESPONSES TO PHQ QUESTIONS 1-9: 0
SUM OF ALL RESPONSES TO PHQ QUESTIONS 1-9: 0
10. IF YOU CHECKED OFF ANY PROBLEMS, HOW DIFFICULT HAVE THESE PROBLEMS MADE IT FOR YOU TO DO YOUR WORK, TAKE CARE OF THINGS AT HOME, OR GET ALONG WITH OTHER PEOPLE: NOT DIFFICULT AT ALL
6. FEELING BAD ABOUT YOURSELF - OR THAT YOU ARE A FAILURE OR HAVE LET YOURSELF OR YOUR FAMILY DOWN: NOT AT ALL
7. TROUBLE CONCENTRATING ON THINGS, SUCH AS READING THE NEWSPAPER OR WATCHING TELEVISION: NOT AT ALL
3. TROUBLE FALLING OR STAYING ASLEEP: NOT AT ALL
4. FEELING TIRED OR HAVING LITTLE ENERGY: NOT AT ALL
5. POOR APPETITE OR OVEREATING: NOT AT ALL
SUM OF ALL RESPONSES TO PHQ QUESTIONS 1-9: 0
SUM OF ALL RESPONSES TO PHQ9 QUESTIONS 1 & 2: 0
2. FEELING DOWN, DEPRESSED OR HOPELESS: NOT AT ALL
9. THOUGHTS THAT YOU WOULD BE BETTER OFF DEAD, OR OF HURTING YOURSELF: NOT AT ALL
1. LITTLE INTEREST OR PLEASURE IN DOING THINGS: NOT AT ALL
SUM OF ALL RESPONSES TO PHQ QUESTIONS 1-9: 0

## 2024-08-27 NOTE — PROGRESS NOTES
OUTPATIENT PSYCHIATRIC RETURN VISIT PROGRESS NOTE  Damion Ayala, was evaluated through a synchronous (real-time) audio-video encounter. The patient (or guardian if applicable) is aware that this is a billable service, which includes applicable co-pays. This Virtual Visit was conducted with patient's (and/or legal guardian's) consent. Patient identification was verified, and a caregiver was present when appropriate.   The patient was located at Home: [address] [address] [facility] [address]  Provider was located at Facility (Appt Dept): [address] [facility]  [address]  [place] [address]  Confirm you are appropriately licensed, registered, or certified to deliver care in the state where the patient is located as indicated above. If you are not or unsure, please re-schedule the visit: Yes, I confirm.        45 minutes total time spent for this encounter: face to face with the patient with more than 50% of the total time spent on education, counseling, & coordination of care of the patient regarding ongoing psychiatric illness.    --Louisa Roberts, EDILSON - CNP on 8/27/2024 at 8:26 AM    An electronic signature was used to authenticate this note.   Date of Service: 8/27/2024     Identification    Damion Ayala is a 48 y.o.  with a past psychiatric history of bipolar d/o, MONROE, insomnia  who presents today for a psychiatric follow up appointment.      CC:  Routine medication management follow up.    Chief Complaint   Patient presents with    Follow-up     3 month f/u        Subjective / Interval History:    Pt visit via My Chart  today and reports that he has lost a total of 60 lbs this year and feels much better overall. Says he experiences less pain and his BP has improved as a result of weight loss. He mentions that he fell while mowing the lawn a couple months ago and hurt his hip and it still bothers him at times. He has not been seen for this and in fact has  not been seen by his PCP since December 30 2021 and I  sleep disturbance    Mental Status Exam:   Appearance  Appears stated age, Well-kept, Well-nourished, Cooperative, and Maintains eye-contact  Behavior  Cooperative and Pleasant  Psychomotor Activity within normal limits  Gait and Station Normal Alexsandra and Station and Normal Balance  Speech   appropriate  Mood    is euthymic  Affect    calm  Thought Process Goal-Directed and Circumstantial  Thought Content/Perceptual Disturbances free of delusions, free of hallucinations, and not internally preoccupied  Cognition/Sensorium Alert, Fully oriented, Normal concentration/ Attention, Recent memory intact, Remote memory intact, and Fund of knowledge adequate for level of education  Insight  good  Judgment good  Assessment:    Damion was seen today for follow-up.    Diagnoses and all orders for this visit:    Bipolar disorder, in full remission, most recent episode depressed (HCC)    MONROE (generalized anxiety disorder)  -     clonazePAM (KLONOPIN) 1 MG tablet; Take 1 tablet by mouth daily as needed for Anxiety for up to 90 days. Max Daily Amount: 1 mg    Insomnia, psychophysiological  -     temazepam (RESTORIL) 30 MG capsule; Take 1 capsule by mouth nightly as needed for Sleep for up to 90 days. Max Daily Amount: 30 mg    Other orders  -     ARIPiprazole (ABILIFY) 20 MG tablet; Take 1 tablet by mouth daily  -     QUEtiapine (SEROQUEL) 400 MG tablet; Take 2 tablets by mouth nightly       Patient Education and Counseling:  Supportive therapy provided for identified psychosocial stressors.  Medication education provided and decisions regarding medication regimen discussed with patient.     Plan:  -  Continue all medications as currently ordered.      Pt encouraged to make appointment with PCP as he has not been seen in 2.5 years.   -  Crisis plan reviewed and patient verbally contracts for safety.  Go to ED with emergent symptoms or safety concerns.  -  Risks, benefits, side effects of medications, including any / all black box

## 2024-10-22 ENCOUNTER — TELEMEDICINE (OUTPATIENT)
Dept: BEHAVIORAL/MENTAL HEALTH CLINIC | Age: 49
End: 2024-10-22
Payer: COMMERCIAL

## 2024-10-22 DIAGNOSIS — F41.1 GAD (GENERALIZED ANXIETY DISORDER): ICD-10-CM

## 2024-10-22 DIAGNOSIS — F32.89 OTHER DEPRESSION: ICD-10-CM

## 2024-10-22 DIAGNOSIS — F51.04 INSOMNIA, PSYCHOPHYSIOLOGICAL: ICD-10-CM

## 2024-10-22 DIAGNOSIS — F31.76 BIPOLAR DISORDER, IN FULL REMISSION, MOST RECENT EPISODE DEPRESSED (HCC): Primary | ICD-10-CM

## 2024-10-22 PROCEDURE — 99215 OFFICE O/P EST HI 40 MIN: CPT | Performed by: NURSE PRACTITIONER

## 2024-10-22 RX ORDER — CLONAZEPAM 1 MG/1
1 TABLET ORAL DAILY PRN
Qty: 30 TABLET | Refills: 2 | Status: SHIPPED | OUTPATIENT
Start: 2024-11-01 | End: 2025-01-30

## 2024-10-22 RX ORDER — TEMAZEPAM 30 MG/1
30 CAPSULE ORAL NIGHTLY PRN
Qty: 30 CAPSULE | Refills: 2 | Status: SHIPPED | OUTPATIENT
Start: 2024-11-01 | End: 2025-01-30

## 2024-10-22 RX ORDER — SERTRALINE HYDROCHLORIDE 100 MG/1
100 TABLET, FILM COATED ORAL DAILY
Qty: 30 TABLET | Refills: 3 | Status: SHIPPED | OUTPATIENT
Start: 2024-10-22 | End: 2025-02-19

## 2024-10-22 RX ORDER — ARIPIPRAZOLE 20 MG/1
20 TABLET ORAL DAILY
Qty: 30 TABLET | Refills: 3 | Status: SHIPPED | OUTPATIENT
Start: 2024-10-22 | End: 2025-02-19

## 2024-10-22 RX ORDER — QUETIAPINE FUMARATE 400 MG/1
800 TABLET, FILM COATED ORAL NIGHTLY
Qty: 60 TABLET | Refills: 3 | Status: SHIPPED | OUTPATIENT
Start: 2024-10-22 | End: 2025-02-19

## 2024-10-22 ASSESSMENT — PATIENT HEALTH QUESTIONNAIRE - PHQ9
1. LITTLE INTEREST OR PLEASURE IN DOING THINGS: NEARLY EVERY DAY
SUM OF ALL RESPONSES TO PHQ QUESTIONS 1-9: 10
8. MOVING OR SPEAKING SO SLOWLY THAT OTHER PEOPLE COULD HAVE NOTICED. OR THE OPPOSITE, BEING SO FIGETY OR RESTLESS THAT YOU HAVE BEEN MOVING AROUND A LOT MORE THAN USUAL: NOT AT ALL
SUM OF ALL RESPONSES TO PHQ9 QUESTIONS 1 & 2: 6
SUM OF ALL RESPONSES TO PHQ QUESTIONS 1-9: 10
9. THOUGHTS THAT YOU WOULD BE BETTER OFF DEAD, OR OF HURTING YOURSELF: NOT AT ALL
4. FEELING TIRED OR HAVING LITTLE ENERGY: NOT AT ALL
2. FEELING DOWN, DEPRESSED OR HOPELESS: NEARLY EVERY DAY
6. FEELING BAD ABOUT YOURSELF - OR THAT YOU ARE A FAILURE OR HAVE LET YOURSELF OR YOUR FAMILY DOWN: SEVERAL DAYS
10. IF YOU CHECKED OFF ANY PROBLEMS, HOW DIFFICULT HAVE THESE PROBLEMS MADE IT FOR YOU TO DO YOUR WORK, TAKE CARE OF THINGS AT HOME, OR GET ALONG WITH OTHER PEOPLE: VERY DIFFICULT
3. TROUBLE FALLING OR STAYING ASLEEP: NOT AT ALL
5. POOR APPETITE OR OVEREATING: SEVERAL DAYS
SUM OF ALL RESPONSES TO PHQ QUESTIONS 1-9: 10
7. TROUBLE CONCENTRATING ON THINGS, SUCH AS READING THE NEWSPAPER OR WATCHING TELEVISION: MORE THAN HALF THE DAYS
SUM OF ALL RESPONSES TO PHQ QUESTIONS 1-9: 10

## 2024-10-22 NOTE — PROGRESS NOTES
OUTPATIENT PSYCHIATRIC RETURN VISIT PROGRESS NOTE  Damion Ayala, was evaluated through a synchronous (real-time) audio-video encounter. The patient (or guardian if applicable) is aware that this is a billable service, which includes applicable co-pays. This Virtual Visit was conducted with patient's (and/or legal guardian's) consent. Patient identification was verified, and a caregiver was present when appropriate.   The patient was located at Home: [address] [facility] [address]  Provider was located at Facility (Appt Dept): [address] [facility]  [address]  [place] [address]  Confirm you are appropriately licensed, registered, or certified to deliver care in the state where the patient is located as indicated above. If you are not or unsure, please re-schedule the visit: Yes, I confirm.        48  minutes total time spent for this encounter: face to face with the patient with more than 50% of the total time spent on education, counseling, & coordination of care of the patient regarding ongoing psychiatric illness.    --Louisa Roberts, APRN - CNP on 10/22/2024 at 1:44 PM    An electronic signature was used to authenticate this note.   Date of Service: 10/22/2024     Identification    Damion Ayala is a 48 y.o.  with a past psychiatric history of bipolar d/o, MONROE, insomnia  who presents today for a psychiatric follow up appointment.      CC:  Routine medication management follow up.    Chief Complaint   Patient presents with    Follow-up     2 month f/u, pt's depression has been worsening        Subjective / Interval History:    Pt visit via My Chart  today and reports that he has had worsening depressive symptoms over the past 2-3 weeks with anergia, decreased motivation, anhedonia, isolation , pervasive sadness, crying. Denies SI. In the past he recalls fluoxetine and venlafaxine being ineffective. He says sertraline was effective in the past. Agrees to start sertraline 50 mg daily and increasing to 100 mg

## 2025-01-06 RX ORDER — QUETIAPINE FUMARATE 400 MG/1
800 TABLET, FILM COATED ORAL NIGHTLY
Qty: 60 TABLET | Refills: 3 | Status: SHIPPED | OUTPATIENT
Start: 2025-01-06 | End: 2025-05-06

## 2025-01-16 DIAGNOSIS — F41.1 GAD (GENERALIZED ANXIETY DISORDER): ICD-10-CM

## 2025-01-16 RX ORDER — CLONAZEPAM 1 MG/1
TABLET ORAL
Qty: 30 TABLET | Refills: 2 | OUTPATIENT
Start: 2025-01-16

## 2025-01-31 DIAGNOSIS — F51.04 INSOMNIA, PSYCHOPHYSIOLOGICAL: ICD-10-CM

## 2025-01-31 DIAGNOSIS — F41.1 GAD (GENERALIZED ANXIETY DISORDER): ICD-10-CM

## 2025-02-03 RX ORDER — CLONAZEPAM 1 MG/1
1 TABLET ORAL DAILY PRN
Qty: 30 TABLET | Refills: 2 | Status: SHIPPED | OUTPATIENT
Start: 2025-02-03 | End: 2025-05-04

## 2025-02-03 RX ORDER — TEMAZEPAM 30 MG/1
30 CAPSULE ORAL NIGHTLY PRN
Qty: 30 CAPSULE | Refills: 2 | Status: SHIPPED | OUTPATIENT
Start: 2025-02-03 | End: 2025-05-04

## 2025-04-04 ENCOUNTER — TELEMEDICINE (OUTPATIENT)
Dept: BEHAVIORAL/MENTAL HEALTH CLINIC | Age: 50
End: 2025-04-04
Payer: COMMERCIAL

## 2025-04-04 DIAGNOSIS — F41.1 GAD (GENERALIZED ANXIETY DISORDER): ICD-10-CM

## 2025-04-04 DIAGNOSIS — F51.04 INSOMNIA, PSYCHOPHYSIOLOGICAL: ICD-10-CM

## 2025-04-04 DIAGNOSIS — F32.89 OTHER DEPRESSION: ICD-10-CM

## 2025-04-04 DIAGNOSIS — F31.76 BIPOLAR DISORDER, IN FULL REMISSION, MOST RECENT EPISODE DEPRESSED: Primary | ICD-10-CM

## 2025-04-04 PROCEDURE — 99215 OFFICE O/P EST HI 40 MIN: CPT | Performed by: NURSE PRACTITIONER

## 2025-04-04 RX ORDER — HYDROXYZINE HYDROCHLORIDE 25 MG/1
25 TABLET, FILM COATED ORAL 4 TIMES DAILY PRN
Qty: 120 TABLET | Refills: 5 | Status: SHIPPED | OUTPATIENT
Start: 2025-04-04 | End: 2025-10-01

## 2025-04-04 RX ORDER — QUETIAPINE FUMARATE 400 MG/1
800 TABLET, FILM COATED ORAL NIGHTLY
Qty: 60 TABLET | Refills: 5 | Status: SHIPPED | OUTPATIENT
Start: 2025-04-04 | End: 2025-10-01

## 2025-04-04 RX ORDER — TEMAZEPAM 30 MG/1
30 CAPSULE ORAL NIGHTLY PRN
Qty: 30 CAPSULE | Refills: 5 | Status: SHIPPED | OUTPATIENT
Start: 2025-04-04 | End: 2025-10-01

## 2025-04-04 RX ORDER — CLONAZEPAM 1 MG/1
1 TABLET ORAL DAILY PRN
Qty: 30 TABLET | Refills: 5 | Status: SHIPPED | OUTPATIENT
Start: 2025-04-04 | End: 2025-10-01

## 2025-04-04 RX ORDER — ARIPIPRAZOLE 20 MG/1
20 TABLET ORAL DAILY
Qty: 30 TABLET | Refills: 5 | Status: SHIPPED | OUTPATIENT
Start: 2025-04-04 | End: 2025-10-01

## 2025-04-04 RX ORDER — SERTRALINE HYDROCHLORIDE 100 MG/1
150 TABLET, FILM COATED ORAL DAILY
Qty: 45 TABLET | Refills: 5 | Status: SHIPPED | OUTPATIENT
Start: 2025-04-04 | End: 2025-10-01

## 2025-04-04 RX ORDER — SERTRALINE HYDROCHLORIDE 100 MG/1
100 TABLET, FILM COATED ORAL DAILY
Qty: 30 TABLET | Refills: 5 | Status: SHIPPED | OUTPATIENT
Start: 2025-04-04 | End: 2025-04-04

## 2025-04-04 RX ORDER — PROPRANOLOL HYDROCHLORIDE 40 MG/1
40 TABLET ORAL 2 TIMES DAILY PRN
Qty: 60 TABLET | Refills: 5 | Status: SHIPPED | OUTPATIENT
Start: 2025-04-04 | End: 2025-10-01

## 2025-04-04 ASSESSMENT — PATIENT HEALTH QUESTIONNAIRE - PHQ9
9. THOUGHTS THAT YOU WOULD BE BETTER OFF DEAD, OR OF HURTING YOURSELF: NOT AT ALL
4. FEELING TIRED OR HAVING LITTLE ENERGY: NOT AT ALL
10. IF YOU CHECKED OFF ANY PROBLEMS, HOW DIFFICULT HAVE THESE PROBLEMS MADE IT FOR YOU TO DO YOUR WORK, TAKE CARE OF THINGS AT HOME, OR GET ALONG WITH OTHER PEOPLE: NOT DIFFICULT AT ALL
5. POOR APPETITE OR OVEREATING: NOT AT ALL
SUM OF ALL RESPONSES TO PHQ QUESTIONS 1-9: 1
1. LITTLE INTEREST OR PLEASURE IN DOING THINGS: NOT AT ALL
SUM OF ALL RESPONSES TO PHQ QUESTIONS 1-9: 1
2. FEELING DOWN, DEPRESSED OR HOPELESS: NOT AT ALL
8. MOVING OR SPEAKING SO SLOWLY THAT OTHER PEOPLE COULD HAVE NOTICED. OR THE OPPOSITE, BEING SO FIGETY OR RESTLESS THAT YOU HAVE BEEN MOVING AROUND A LOT MORE THAN USUAL: NOT AT ALL
7. TROUBLE CONCENTRATING ON THINGS, SUCH AS READING THE NEWSPAPER OR WATCHING TELEVISION: NOT AT ALL
3. TROUBLE FALLING OR STAYING ASLEEP: SEVERAL DAYS
6. FEELING BAD ABOUT YOURSELF - OR THAT YOU ARE A FAILURE OR HAVE LET YOURSELF OR YOUR FAMILY DOWN: NOT AT ALL

## 2025-04-04 NOTE — PROGRESS NOTES
OUTPATIENT PSYCHIATRIC RETURN VISIT PROGRESS NOTE  Damion Ayala, was evaluated through a synchronous (real-time) audio-video encounter. The patient (or guardian if applicable) is aware that this is a billable service, which includes applicable co-pays. This Virtual Visit was conducted with patient's (and/or legal guardian's) consent. Patient identification was verified, and a caregiver was present when appropriate.   The patient was located at Home: [address] [facility] [address]  Provider was located at Facility (Appt Dept): [address] [facility]  [address]  [place] [address]  Confirm you are appropriately licensed, registered, or certified to deliver care in the state where the patient is located as indicated above. If you are not or unsure, please re-schedule the visit: Yes, I confirm.        45  minutes total time spent for this encounter: face to face with the patient with more than 50% of the total time spent on education, counseling, & coordination of care of the patient regarding ongoing psychiatric illness.    --Louisa Roberts, APRN - CNP on 4/4/2025 at 1:13 PM    An electronic signature was used to authenticate this note.   Date of Service: 4/4/2025     Identification    Damion Ayala is a 49 y.o.  with a past psychiatric history of bipolar d/o, insomnia, MONROE who presents today for a psychiatric follow up appointment.      CC:  Routine medication management follow up.    Chief Complaint   Patient presents with    6 Month Follow-Up        Subjective / Interval History:    Pt visit via My Chart  today and reports that depressive symptoms improving but still reports anxiety. Agrees to increase sertraline to 150 mg daily and add hydroxyzine 25 mg QID prn and propranolol 40 mg bid prn  Patient's mood is stabilizing on current medication regimen and patient is tolerating current medications with no adverse effects.  Quetiapine and Abilify effective for mood stabilization, denies symptoms of depression,

## 2025-04-11 DIAGNOSIS — F41.1 GAD (GENERALIZED ANXIETY DISORDER): ICD-10-CM

## 2025-04-11 RX ORDER — HYDROXYZINE HYDROCHLORIDE 50 MG/1
50 TABLET, FILM COATED ORAL 4 TIMES DAILY PRN
Qty: 120 TABLET | Refills: 5 | Status: SHIPPED | OUTPATIENT
Start: 2025-04-11 | End: 2025-10-08